# Patient Record
(demographics unavailable — no encounter records)

---

## 2025-02-20 NOTE — HISTORY OF PRESENT ILLNESS
[FreeTextEntry8] : Pt is here for excessive sweating. Want to check for hyperthyroidism before they address medication side effects with his psychiatrist.

## 2025-02-20 NOTE — PLAN
[FreeTextEntry1] : Avoid overdressing while inside during the winter months Await thyroid results If normal and after changing clothing, may need to discuss with psychiatrist.

## 2025-06-13 NOTE — END OF VISIT
[FreeTextEntry3] : Recommendation: A PSA, urinalysis, cytology, and culture have been ordered. A refill was given for Gemtesa 75mg qhs. The patient will follow up in one year or as needed.

## 2025-06-13 NOTE — HISTORY OF PRESENT ILLNESS
[FreeTextEntry1] : EDWAR OSPINA is a 40 year old male presenting for incontinence and doing well on Gemtesa. Patient was accompanied by a formal caregiver who reports patient is doing well on all current medications.

## 2025-06-13 NOTE — ADDENDUM
[FreeTextEntry1] : I, Patt Ch, acted as a scribe on behalf of Dr. Ying 06/13/2025.   All medical record entries made by the Scribe were at my, Dr. Ying, direction and personally dictated by me on 06/13/2025. I have reviewed the chart and agree that the record accurately reflects my personal performance of the history, physical exam, assessment, and plan. I have also personally directed, reviewed, and agreed with the chart.

## 2025-06-20 NOTE — PHYSICAL EXAM

## 2025-06-20 NOTE — HEALTH RISK ASSESSMENT
[Very Good] : ~his/her~  mood as very good [No falls in past year] : Patient reported no falls in the past year [Little interest or pleasure doing things] : 1) Little interest or pleasure doing things [Feeling down, depressed, or hopeless] : 2) Feeling down, depressed, or hopeless [0] : 2) Feeling down, depressed, or hopeless: Not at all (0) [Never] : Never [Change in mental status noted] : No change in mental status noted

## 2025-07-03 NOTE — PLAN
[FreeTextEntry1] : Quick strep test negative gargle with warm salt water as needed Await results of repeat potassium level Tylenol as needed for pain

## 2025-07-03 NOTE — HISTORY OF PRESENT ILLNESS
[FreeTextEntry6] : pt is here for potassium check Also c/o sore throat. No fever of chills. Some runny nose in the am.